# Patient Record
Sex: FEMALE | Race: WHITE | NOT HISPANIC OR LATINO
[De-identification: names, ages, dates, MRNs, and addresses within clinical notes are randomized per-mention and may not be internally consistent; named-entity substitution may affect disease eponyms.]

---

## 2019-03-12 ENCOUNTER — RX RENEWAL (OUTPATIENT)
Age: 49
End: 2019-03-12

## 2020-02-19 ENCOUNTER — EMERGENCY (EMERGENCY)
Facility: HOSPITAL | Age: 50
LOS: 1 days | Discharge: HOME | End: 2020-02-19
Attending: EMERGENCY MEDICINE
Payer: COMMERCIAL

## 2020-02-19 VITALS
OXYGEN SATURATION: 97 % | HEART RATE: 81 BPM | WEIGHT: 164.91 LBS | TEMPERATURE: 98 F | DIASTOLIC BLOOD PRESSURE: 96 MMHG | HEIGHT: 64 IN | SYSTOLIC BLOOD PRESSURE: 156 MMHG | RESPIRATION RATE: 18 BRPM

## 2020-02-19 VITALS
OXYGEN SATURATION: 98 % | RESPIRATION RATE: 18 BRPM | DIASTOLIC BLOOD PRESSURE: 101 MMHG | HEART RATE: 75 BPM | SYSTOLIC BLOOD PRESSURE: 185 MMHG | TEMPERATURE: 98 F

## 2020-02-19 LAB
ALBUMIN SERPL ELPH-MCNC: 4.2 G/DL — SIGNIFICANT CHANGE UP (ref 3.3–5)
ALP SERPL-CCNC: 76 U/L — SIGNIFICANT CHANGE UP (ref 40–120)
ALT FLD-CCNC: 20 U/L — SIGNIFICANT CHANGE UP (ref 10–45)
ANION GAP SERPL CALC-SCNC: 14 MMOL/L — SIGNIFICANT CHANGE UP (ref 5–17)
AST SERPL-CCNC: 18 U/L — SIGNIFICANT CHANGE UP (ref 10–40)
BASOPHILS # BLD AUTO: 0.03 K/UL — SIGNIFICANT CHANGE UP (ref 0–0.2)
BASOPHILS NFR BLD AUTO: 0.3 % — SIGNIFICANT CHANGE UP (ref 0–2)
BILIRUB SERPL-MCNC: 0.3 MG/DL — SIGNIFICANT CHANGE UP (ref 0.2–1.2)
BUN SERPL-MCNC: 9 MG/DL — SIGNIFICANT CHANGE UP (ref 7–23)
CALCIUM SERPL-MCNC: 9 MG/DL — SIGNIFICANT CHANGE UP (ref 8.4–10.5)
CHLORIDE SERPL-SCNC: 105 MMOL/L — SIGNIFICANT CHANGE UP (ref 96–108)
CO2 SERPL-SCNC: 23 MMOL/L — SIGNIFICANT CHANGE UP (ref 22–31)
CREAT SERPL-MCNC: 0.57 MG/DL — SIGNIFICANT CHANGE UP (ref 0.5–1.3)
EOSINOPHIL # BLD AUTO: 0.16 K/UL — SIGNIFICANT CHANGE UP (ref 0–0.5)
EOSINOPHIL NFR BLD AUTO: 1.8 % — SIGNIFICANT CHANGE UP (ref 0–6)
GLUCOSE SERPL-MCNC: 102 MG/DL — HIGH (ref 70–99)
HCT VFR BLD CALC: 41.9 % — SIGNIFICANT CHANGE UP (ref 34.5–45)
HGB BLD-MCNC: 13.7 G/DL — SIGNIFICANT CHANGE UP (ref 11.5–15.5)
IMM GRANULOCYTES NFR BLD AUTO: 0.2 % — SIGNIFICANT CHANGE UP (ref 0–1.5)
LYMPHOCYTES # BLD AUTO: 2.39 K/UL — SIGNIFICANT CHANGE UP (ref 1–3.3)
LYMPHOCYTES # BLD AUTO: 26.8 % — SIGNIFICANT CHANGE UP (ref 13–44)
MCHC RBC-ENTMCNC: 27.1 PG — SIGNIFICANT CHANGE UP (ref 27–34)
MCHC RBC-ENTMCNC: 32.7 GM/DL — SIGNIFICANT CHANGE UP (ref 32–36)
MCV RBC AUTO: 83 FL — SIGNIFICANT CHANGE UP (ref 80–100)
MONOCYTES # BLD AUTO: 0.94 K/UL — HIGH (ref 0–0.9)
MONOCYTES NFR BLD AUTO: 10.5 % — SIGNIFICANT CHANGE UP (ref 2–14)
NEUTROPHILS # BLD AUTO: 5.38 K/UL — SIGNIFICANT CHANGE UP (ref 1.8–7.4)
NEUTROPHILS NFR BLD AUTO: 60.4 % — SIGNIFICANT CHANGE UP (ref 43–77)
NRBC # BLD: 0 /100 WBCS — SIGNIFICANT CHANGE UP (ref 0–0)
PLATELET # BLD AUTO: 225 K/UL — SIGNIFICANT CHANGE UP (ref 150–400)
POTASSIUM SERPL-MCNC: 4 MMOL/L — SIGNIFICANT CHANGE UP (ref 3.5–5.3)
POTASSIUM SERPL-SCNC: 4 MMOL/L — SIGNIFICANT CHANGE UP (ref 3.5–5.3)
PROT SERPL-MCNC: 6.7 G/DL — SIGNIFICANT CHANGE UP (ref 6–8.3)
RBC # BLD: 5.05 M/UL — SIGNIFICANT CHANGE UP (ref 3.8–5.2)
RBC # FLD: 12.9 % — SIGNIFICANT CHANGE UP (ref 10.3–14.5)
SODIUM SERPL-SCNC: 142 MMOL/L — SIGNIFICANT CHANGE UP (ref 135–145)
WBC # BLD: 8.92 K/UL — SIGNIFICANT CHANGE UP (ref 3.8–10.5)
WBC # FLD AUTO: 8.92 K/UL — SIGNIFICANT CHANGE UP (ref 3.8–10.5)

## 2020-02-19 PROCEDURE — 85027 COMPLETE CBC AUTOMATED: CPT

## 2020-02-19 PROCEDURE — 93005 ELECTROCARDIOGRAM TRACING: CPT

## 2020-02-19 PROCEDURE — 36415 COLL VENOUS BLD VENIPUNCTURE: CPT

## 2020-02-19 PROCEDURE — 80053 COMPREHEN METABOLIC PANEL: CPT

## 2020-02-19 PROCEDURE — 99284 EMERGENCY DEPT VISIT MOD MDM: CPT

## 2020-02-19 PROCEDURE — 99283 EMERGENCY DEPT VISIT LOW MDM: CPT

## 2020-02-19 RX ORDER — SODIUM CHLORIDE 9 MG/ML
1000 INJECTION INTRAMUSCULAR; INTRAVENOUS; SUBCUTANEOUS ONCE
Refills: 0 | Status: COMPLETED | OUTPATIENT
Start: 2020-02-19 | End: 2020-02-19

## 2020-02-19 RX ORDER — MECLIZINE HCL 12.5 MG
1 TABLET ORAL
Qty: 9 | Refills: 0
Start: 2020-02-19 | End: 2020-02-21

## 2020-02-19 RX ADMIN — SODIUM CHLORIDE 1000 MILLILITER(S): 9 INJECTION INTRAMUSCULAR; INTRAVENOUS; SUBCUTANEOUS at 20:58

## 2020-02-19 NOTE — ED ADULT NURSE NOTE - NSIMPLEMENTINTERV_GEN_ALL_ED
Implemented All Universal Safety Interventions:  [place] to call system. Call bell, personal items and telephone within reach. Instruct patient to call for assistance. Room bathroom lighting operational. Non-slip footwear when patient is off stretcher. Physically safe environment: no spills, clutter or unnecessary equipment. Stretcher in lowest position, wheels locked, appropriate side rails in place.

## 2020-02-19 NOTE — ED PROVIDER NOTE - NSFOLLOWUPINSTRUCTIONS_ED_ALL_ED_FT
1.  Stay well hydrated  2.  Take meclizine 25-50mgs every 8 hrs as needed for dizziness  3.  Follow up with your PMD in 2-3 days.       Follow up with ENT upon discharge  4. Return to the ER for worsening dizziness, headache, blurry vision, weakness, numbness or any other concerning symptoms

## 2020-02-19 NOTE — ED PROVIDER NOTE - PHYSICAL EXAMINATION
Gen: AAO x 3, NAD  Skin: No rashes or lesions  HEENT: NC/AT, PERRLA, EOMI, MMM  Resp: unlabored CTAB  Cardiac: rrr s1s2, no murmurs, rubs or gallops  GI: ND, +BS, Soft, NT  Ext: no pedal edema, FROM in all extremities  Neuro: no focal deficits, Strength 5/5 BUE and BLE, sensation intact.  Normal finger to nose, normal heel to shin, normal tandem gait, negative romberg, no pronator drift Gen: AAO x 3, NAD  Skin: No rashes or lesions  HEENT: NC/AT, PERRLA, EOMI, MMM  Resp: unlabored CTAB  Cardiac: rrr s1s2, no murmurs, rubs or gallops  GI: ND, +BS, Soft, NT  Ext: no pedal edema, FROM in all extremities  Neuro: no focal deficits, Strength 5/5 BUE and BLE, sensation intact.  Normal finger to nose, normal heel to shin, normal tandem gait, negative romberg, no pronator drift      Attending note. She is alert and in no acute distress. There is no facial symmetry. Pupils are 3 mm equal reactive. Extraocular muscles are intact. There are a few beats of nystagmus to the left. ENT examination is normal. Hearing is grossly intact. Oropharynx is normal. Neck is supple and nontender. Lungs are clear and equal bilaterally. Heart is regular in rhythm without murmur. Abdomen is soft and nontender. Neurologic examination is intact and nonfocal. Romberg test is negative. Finger to nose is intact. Sensation is intact and normal. Vertigo is exacerbated by movements and extinguishes and extinguishes wine staying still.

## 2020-02-19 NOTE — ED ADULT NURSE NOTE - OBJECTIVE STATEMENT
49yF who presents to the ED from home for complaints of dizziness. PMH of hypothyroidism on synthroid. Pt states she has a sudden onset of dizziness and N at 1330 today. Since she has been having intermittent episodes which is unrelieved by lying down 49yF who presents to the ED from home for complaints of dizziness. PMH of hypothyroidism on synthroid. Pt states she has a sudden onset of dizziness and N at 1330 today while being a passenger in her car. Since she has been having intermittent episodes which is unrelieved by lying down. Pt took meclizine at 1400 today and symptoms still persisted. pt states these episodes have never happened before. Pt denies vomiting, fevers/chills, CP, SOB, abdominal pain, tinnitus, blurred vision, numbness/tingling, headache, C/D. Pt AAOx4, VS as documented. IV placed. Labs drawn and sent.

## 2020-02-19 NOTE — ED PROVIDER NOTE - PATIENT PORTAL LINK FT
You can access the FollowMyHealth Patient Portal offered by [facility] by registering at the following website: [email or web address]. By joining [facility]’s FollowMyHealth portal, you will also be able to view your health information using other applications (apps) compatible with our system.

## 2020-02-19 NOTE — ED PROVIDER NOTE - OBJECTIVE STATEMENT
50 yo female with PMHx of Hypothyroid p/w dizziness.  Patient reports that she was the passenger driving home this afternoon and felt nauseous.  Upon arriving how she first felt lightheaded, then experiencing room spinning dizziness.  Dizziness was worse with moving her head and standing up.  improved with laying still.  Took meclizine 25mgs at 2 pm, initially without improvement, but upon arrival to the ER patient is feeling much better.  Dizziness improved, now only with slight lightheadedness.  Denies CP, SOB, abd pain, NVD, dysuria, weakness, numbness, blurry vision. 50 yo female with PMHx of Hypothyroid p/w dizziness.  Patient reports that she was the passenger driving home this afternoon and felt nauseous.  Upon arriving how she first felt lightheaded, then experiencing room spinning dizziness.  Dizziness was worse with moving her head and standing up.  improved with laying still.  Took meclizine 25mgs at 2 pm, initially without improvement, but upon arrival to the ER patient is feeling much better.  Dizziness improved, now only with slight lightheadedness.  Denies CP, SOB, abd pain, NVD, dysuria, weakness, numbness, blurry vision.      Attending note.  Patient was seen in the fast track room #5. Agree with the above. Patient had acute onset of vertigo after driving. She felt some lightheadedness and dizziness. She denies any chest pain or palpitations. She denies any hearing loss or tinnitus. She denies any prior episodes. She has no other neurologic symptoms. Vertigo is exacerbated by movements and extinguishes one stainless steel. She denies any trauma or recent ENT complaints. She has no previous episode of vertigo. She denies any fevers or chills. She took 25 mg of meclizine and 2 PM currently symptoms have subsided.

## 2020-04-20 ENCOUNTER — APPOINTMENT (OUTPATIENT)
Dept: OTOLARYNGOLOGY | Facility: CLINIC | Age: 50
End: 2020-04-20

## 2021-06-28 NOTE — ED ADULT NURSE NOTE - TEMPLATE LIST FOR HEAD TO TOE ASSESSMENT
Arlinda Frankel  [id number]  1990    EGD DISCHARGE INSTRUCTIONS  Discomfort:  Sore throat- throat lozenges or warm salt water gargle  redness at IV site- apply warm compress to area; if redness or soreness persist- contact your physician  Gaseous discomfort- walking, belching will help relieve any discomfort  You may not operate a vehicle until the next day  You may not engage in an occupation involving machinery or appliances until the next day  You may not drink alcoholic beverages until the next day  Avoid making any critical decisions for at least 24 hour    DIET   You may not resume your regular diet. Antireflux diet. ACTIVITY  You may not resume your normal daily activities   Spend the remainder of the day resting -  avoid any strenuous activity. CALL M.D. ANY SIGN OF   Increasing pain, nausea, vomiting  Abdominal distension (swelling)  New increased bleeding (oral or rectal)  Fever (chills)  Pain in chest area  Bloody discharge from nose or mouth  Shortness of breath     You may not take any Advil, Aspirin, Ibuprofen, Motrin, Aleve, or Goodys preferably ONLY  Tylenol as needed for pain. Follow-up Instructions: Follow-up in the office as scheduled or make a follow-up appointment in 2 weeks.      Evan France MD  June 28, 2021 General

## 2022-10-17 ENCOUNTER — APPOINTMENT (OUTPATIENT)
Dept: ENDOCRINOLOGY | Facility: CLINIC | Age: 52
End: 2022-10-17
Payer: COMMERCIAL

## 2022-10-17 VITALS
OXYGEN SATURATION: 99 % | DIASTOLIC BLOOD PRESSURE: 78 MMHG | SYSTOLIC BLOOD PRESSURE: 120 MMHG | TEMPERATURE: 98.6 F | BODY MASS INDEX: 28.15 KG/M2 | RESPIRATION RATE: 15 BRPM | WEIGHT: 164 LBS | HEART RATE: 78 BPM

## 2022-10-17 PROCEDURE — 99204 OFFICE O/P NEW MOD 45 MIN: CPT

## 2022-10-23 NOTE — HISTORY OF PRESENT ILLNESS
[FreeTextEntry1] : Ms. NELSON is a 52 year old female who presents for initial endocrine evaluation. She had previously seen me at my prior Office more than three year ago. She presents with regard to a history of hypothyroidism diagnosed in 1997. Patient had been taking levothyroxine 125 mcg daily for the last 3-4 years . She had seen her cardiologist due to elevated BP/ migraines and had complete blood work done. Labs from 10/7/22 show TSH at  0.013 with FT4 1.78.\par \par She denies any palpitations, sob , tremors, anxiety, weight changes, temperature intolerance, severe fatigue, change in bowel habits, changes in mood. she also denies skin, hair, or nail changes. \par \par In early October, she did experience a migraine for 4 days with BP at home systolic 220. When she saw her cardiologist, BP had been 180/105. She was started on  amlodipine 10 mg since then and does have cardiology follow up appt. on 10/25/22. \par BP in office today  120/74 \par \par labs dated 10/7/22\par cbc stable \par ALT 35, chol 216, hdl 47 ldl 136 trig 202 \par a1c 5.0% \par \par  Additional medical history includes that of HTN, gestational diabetes only in first pregnancy, second pregnancy normal.\par Medications: vitamin C 2,000 mg, vitamin D 4000 IU, amlodipine 10 mg,  \par \par LMP in June 2022 and then prior to that Aug 2021. \par \par Family Hx: Father with HTN/HLD. Mother with HTN/HLD. Brother with HTN and CVD. 1st cousins had thyroid disease, unknown if other family members had.\par \par COVID infection May 2022, did receive paxlovid and no residual symptoms. COVID vaccinated without booster \par \par Social HX: Socially drinks. Denies drug use. past cigarette smoker, quit 15 years ago. \par \par Cardiologist: Dr. Carlos Michelle \par PMD: Looking for new PMD\par \par Past hospitalizations: hospitalization for vertigo in February 2020. Etiology uncertain. Has no reoccurrence since.

## 2023-01-09 ENCOUNTER — APPOINTMENT (OUTPATIENT)
Dept: ENDOCRINOLOGY | Facility: CLINIC | Age: 53
End: 2023-01-09
Payer: COMMERCIAL

## 2023-01-09 VITALS
SYSTOLIC BLOOD PRESSURE: 110 MMHG | DIASTOLIC BLOOD PRESSURE: 70 MMHG | OXYGEN SATURATION: 98 % | HEIGHT: 64 IN | WEIGHT: 164 LBS | TEMPERATURE: 97.5 F | HEART RATE: 85 BPM | BODY MASS INDEX: 28 KG/M2

## 2023-01-09 LAB
25(OH)D3 SERPL-MCNC: 39.4 NG/ML
ESTRADIOL SERPL-MCNC: 9 PG/ML
FOLATE SERPL-MCNC: >20 NG/ML
FSH SERPL-MCNC: 14.3 IU/L
LH SERPL-ACNC: 6 IU/L
T3FREE SERPL-MCNC: 2.75 PG/ML
T4 FREE SERPL-MCNC: 1.7 NG/DL
TSH SERPL-ACNC: 0.12 UIU/ML
VIT B12 SERPL-MCNC: 368 PG/ML

## 2023-01-09 PROCEDURE — 76536 US EXAM OF HEAD AND NECK: CPT

## 2023-01-09 PROCEDURE — 99214 OFFICE O/P EST MOD 30 MIN: CPT

## 2023-01-09 RX ORDER — MAGNESIUM 200 MG
1000 TABLET ORAL
Qty: 45 | Refills: 0 | Status: ACTIVE | COMMUNITY
Start: 2023-01-09 | End: 1900-01-01

## 2023-01-09 NOTE — PROCEDURE
[[employer] e 2008 model, 10-12 MHz frequencies] : multiple real time longitudinal and transverse images were obtained using a high resolution ultrasound with a linear transducer, [employer] e 2008 model, 10-12 MHz frequencies. All measurements will be reported as longitudinal x matheus-posterior x transverse. [Hypothyroidism] : hypothyroidism [] : a heterogeneous parenchyma [FreeTextEntry1] : 2.90 x 1.91 x 1.84 [FreeTextEntry5] : 3.16 x 0.88 x 1.70 [FreeTextEntry2] : 0.23

## 2023-01-09 NOTE — PROCEDURE
[[employer] e 2008 model, 10-12 MHz frequencies] : multiple real time longitudinal and transverse images were obtained using a high resolution ultrasound with a linear transducer, [employer] e 2008 model, 10-12 MHz frequencies. All measurements will be reported as longitudinal x matheus-posterior x transverse. [Hypothyroidism] : hypothyroidism [] : a heterogeneous parenchyma [FreeTextEntry1] : 2.90 x 1.91 x 1.84 [FreeTextEntry5] : 3.16 x 0.88 x 1.70 [FreeTextEntry2] : 0.23

## 2023-01-10 LAB
THYROGLOB AB SERPL-ACNC: <20 IU/ML
THYROPEROXIDASE AB SERPL IA-ACNC: <10 IU/ML

## 2023-01-15 NOTE — HISTORY OF PRESENT ILLNESS
[FreeTextEntry1] : Ms. NELSON is a 52 year old female who  returns with regard to a history of hypothyroidism diagnosed in 1997. Patient had been taking levothyroxine 125 mcg daily for the last 3-4 years . She had seen her cardiologist due to elevated BP/ migraines and had complete blood work done. Labs from  1/7/2023 show TSH at  0.27 with FT4 1.7.\par LT4 was lowered to 100 mcg and she returns now for follow up\par \par She denies any palpitations, sob , tremors, anxiety, weight changes, temperature intolerance, severe fatigue, change in bowel habits, changes in mood. she also denies skin, hair, or nail changes. \par \par In early October, she did experience a migraine for 4 days with BP at home systolic 220. When she saw her cardiologist, BP had been 180/105. She was started on  amlodipine 10 mg since then and does have cardiology follow up appt. on 10/25/22. In December 2022 she was now switched off and changed to Diovan 160 mg. Cardiologist Dr. Haider/ [place]\par \par \par  Additional medical history includes that of HTN, gestational diabetes only in first pregnancy, second pregnancy normal.\par Medications: vitamin C 2,000 mg, vitamin D 4000 IU, switched off amlodipine 10 mg,  now on 160mg of Diovan\par \par LMP in June 2022 and then prior to that Aug 2021. \par \par COVID infection May 2022, did receive paxlovid and no residual symptoms. COVID vaccinated without booster \par \par Cardiologist: Dr. Carlos Michelle \par PMD: Looking for new PMD\par \par Past hospitalizations: hospitalization for vertigo in February 2020. Etiology uncertain. Has no reoccurrence since.

## 2023-02-28 LAB
25(OH)D3 SERPL-MCNC: 39.4 NG/ML
T3FREE SERPL-MCNC: 2.64 PG/ML
T4 FREE SERPL-MCNC: 1.6 NG/DL
TSH SERPL-ACNC: 0.48 UIU/ML
VIT B12 SERPL-MCNC: 588 PG/ML

## 2023-05-26 ENCOUNTER — APPOINTMENT (OUTPATIENT)
Dept: ENDOCRINOLOGY | Facility: CLINIC | Age: 53
End: 2023-05-26
Payer: COMMERCIAL

## 2023-05-26 ENCOUNTER — TRANSCRIPTION ENCOUNTER (OUTPATIENT)
Age: 53
End: 2023-05-26

## 2023-05-26 VITALS
HEIGHT: 64 IN | TEMPERATURE: 98.2 F | DIASTOLIC BLOOD PRESSURE: 78 MMHG | HEART RATE: 86 BPM | SYSTOLIC BLOOD PRESSURE: 110 MMHG | WEIGHT: 157 LBS | OXYGEN SATURATION: 98 % | BODY MASS INDEX: 26.8 KG/M2

## 2023-05-26 PROCEDURE — 36415 COLL VENOUS BLD VENIPUNCTURE: CPT

## 2023-05-26 PROCEDURE — 99214 OFFICE O/P EST MOD 30 MIN: CPT | Mod: 25

## 2023-05-30 ENCOUNTER — NON-APPOINTMENT (OUTPATIENT)
Age: 53
End: 2023-05-30

## 2023-05-30 LAB
25(OH)D3 SERPL-MCNC: 34.5 NG/ML
T3FREE SERPL-MCNC: 3.22 PG/ML
T4 FREE SERPL-MCNC: 1.7 NG/DL
THYROGLOB AB SERPL-ACNC: <20 IU/ML
THYROPEROXIDASE AB SERPL IA-ACNC: <10 IU/ML
TSH SERPL-ACNC: 0.34 UIU/ML
VIT B12 SERPL-MCNC: 540 PG/ML

## 2023-05-30 RX ORDER — LEVOTHYROXINE SODIUM 0.09 MG/1
88 TABLET ORAL
Qty: 90 | Refills: 2 | Status: ACTIVE | COMMUNITY
Start: 2019-03-12 | End: 1900-01-01

## 2023-06-04 NOTE — HISTORY OF PRESENT ILLNESS
[FreeTextEntry1] : Ms. NELSON is a 52 year old female who returns with regard to a history of hypothyroidism diagnosed in 1997. Patient had been taking levothyroxine 125 mcg daily for the last 3-4 years . She had seen her cardiologist due to elevated BP/ migraines and had complete blood work done. Labs from 1/7/2023 show TSH at 0.27 with FT4 1.7.\par LT4 was lowered to 88 mcg in January 2023 and she now returns for follow up. \par \par Repeat labs 2/27/23 \par TSH   0.48      free t4: 1.6 \par \par She denies any palpitations, sob , tremors, anxiety, weight changes, temperature intolerance, severe fatigue, change in bowel habits, changes in mood. she also denies skin, hair, or nail changes. \par \par In early October, she did experience a migraine for 4 days with BP at home systolic 220. When she saw her cardiologist, BP had been 180/105. She was started on amlodipine 10 mg since then and does have cardiology follow up appt. on 10/25/22. In December 2022 she was switched off and changed to Diovan 160 mg. She now continues on Valsartan-HCTZ 160-25 mg. Cardiologist Dr. Haider/ [place]\par \par  Additional medical history includes that of HTN, gestational diabetes only in first pregnancy, second pregnancy normal.\par Medications: vitamin C 2,000 mg, vitamin D 2000 IU, vitamin b12,  switched off amlodipine 10 mg, now on Valsartan-HCTZ 160-25 m\par \par LMP in January 2023 and then prior to that  June 2022 \par COVID infection May 2022, did receive paxlovid and no residual symptoms. COVID vaccinated without booster \par \par Cardiologist: Dr. Carlos Michelle \par PMD: Looking for new PMD\par \par Past hospitalizations: hospitalization for vertigo in February 2020. Etiology uncertain. Has no reoccurrence since. \par

## 2023-11-06 ENCOUNTER — APPOINTMENT (OUTPATIENT)
Dept: ENDOCRINOLOGY | Facility: CLINIC | Age: 53
End: 2023-11-06

## 2023-11-29 NOTE — ED ADULT NURSE NOTE - DOES PATIENT HAVE ADVANCE DIRECTIVE
unknown Render In Strict Bullet Format?: No Detail Level: Zone Continue Regimen: Accutane 40mg bid with food

## 2024-03-05 ENCOUNTER — APPOINTMENT (OUTPATIENT)
Dept: ENDOCRINOLOGY | Facility: CLINIC | Age: 54
End: 2024-03-05
Payer: COMMERCIAL

## 2024-03-05 VITALS
WEIGHT: 165.56 LBS | HEIGHT: 64 IN | SYSTOLIC BLOOD PRESSURE: 110 MMHG | TEMPERATURE: 98.5 F | OXYGEN SATURATION: 95 % | BODY MASS INDEX: 28.27 KG/M2 | DIASTOLIC BLOOD PRESSURE: 60 MMHG | HEART RATE: 74 BPM

## 2024-03-05 DIAGNOSIS — E03.9 HYPOTHYROIDISM, UNSPECIFIED: ICD-10-CM

## 2024-03-05 DIAGNOSIS — O24.419 GESTATIONAL DIABETES MELLITUS IN PREGNANCY, UNSPECIFIED CONTROL: ICD-10-CM

## 2024-03-05 DIAGNOSIS — E55.9 VITAMIN D DEFICIENCY, UNSPECIFIED: ICD-10-CM

## 2024-03-05 DIAGNOSIS — R79.89 OTHER SPECIFIED ABNORMAL FINDINGS OF BLOOD CHEMISTRY: ICD-10-CM

## 2024-03-05 DIAGNOSIS — I10 ESSENTIAL (PRIMARY) HYPERTENSION: ICD-10-CM

## 2024-03-05 PROCEDURE — 99214 OFFICE O/P EST MOD 30 MIN: CPT

## 2024-03-06 LAB
25(OH)D3 SERPL-MCNC: 38.6 NG/ML
ALBUMIN SERPL ELPH-MCNC: 4.6 G/DL
ALP BLD-CCNC: 76 U/L
ALT SERPL-CCNC: 26 U/L
ANION GAP SERPL CALC-SCNC: 17 MMOL/L
AST SERPL-CCNC: 29 U/L
BILIRUB SERPL-MCNC: 0.4 MG/DL
BUN SERPL-MCNC: 18 MG/DL
CALCIUM SERPL-MCNC: 9.5 MG/DL
CHLORIDE SERPL-SCNC: 103 MMOL/L
CHOLEST SERPL-MCNC: 198 MG/DL
CO2 SERPL-SCNC: 25 MMOL/L
CREAT SERPL-MCNC: 0.88 MG/DL
EGFR: 79 ML/MIN/1.73M2
ESTIMATED AVERAGE GLUCOSE: 103 MG/DL
GLUCOSE SERPL-MCNC: 86 MG/DL
HBA1C MFR BLD HPLC: 5.2 %
HDLC SERPL-MCNC: 45 MG/DL
LDLC SERPL DIRECT ASSAY-MCNC: 116 MG/DL
MAGNESIUM SERPL-MCNC: 2.1 MG/DL
POTASSIUM SERPL-SCNC: 3.7 MMOL/L
PROT SERPL-MCNC: 6.9 G/DL
SODIUM SERPL-SCNC: 145 MMOL/L
T3FREE SERPL-MCNC: 2.97 PG/ML
T4 FREE SERPL-MCNC: 1.5 NG/DL
TRIGL SERPL-MCNC: 194 MG/DL
TSH SERPL-ACNC: 1.08 UIU/ML
VIT B12 SERPL-MCNC: 544 PG/ML

## 2024-03-07 LAB
THYROGLOB AB SERPL-ACNC: <20 IU/ML
THYROPEROXIDASE AB SERPL IA-ACNC: <10 IU/ML

## 2024-03-12 PROBLEM — I10 BENIGN ESSENTIAL HYPERTENSION: Status: ACTIVE | Noted: 2022-10-17

## 2024-03-12 PROBLEM — R79.89 LOW VITAMIN B12 LEVEL: Status: ACTIVE | Noted: 2023-01-09

## 2024-03-12 NOTE — HISTORY OF PRESENT ILLNESS
[FreeTextEntry1] : Ms. NELSON is a 53 year old female who returns with regard to a history of hypothyroidism diagnosed in 1997. Patient had been taking levothyroxine 88 mcg daily and she returns now for follow up.   She denies any palpitations, sob , tremors, anxiety, weight changes, temperature intolerance, severe fatigue, change in bowel habits, changes in mood. she also denies skin, hair, or nail changes.   is taking valsartan 160 mg for hypertension. Cardiologist Dr. Haider/ [place]   Additional medical history includes that of HTN, gestational diabetes only in first pregnancy, second pregnancy normal. Medications: vitamin D 4000 IU, switched off amlodipine 10 mg, now on 160 mg of Diovan Too, is taking vitamin D and b12 intermittently - often forgets to take.   LMP in June 2022 and then prior to that Aug 2021.   COVID infection May 2022, did receive paxlovid and no residual symptoms. COVID vaccinated without booster   Cardiologist: Dr. Carlos Michelle  PMD: Looking for new PMD  Past hospitalizations: hospitalization for vertigo in February 2020. Etiology uncertain. Has no reoccurrence since.

## 2024-06-12 ENCOUNTER — RX RENEWAL (OUTPATIENT)
Age: 54
End: 2024-06-12

## 2024-06-12 RX ORDER — LEVOTHYROXINE SODIUM 0.09 MG/1
88 TABLET ORAL DAILY
Qty: 90 | Refills: 2 | Status: ACTIVE | COMMUNITY
Start: 2023-01-09 | End: 1900-01-01

## 2024-12-16 ENCOUNTER — APPOINTMENT (OUTPATIENT)
Dept: ENDOCRINOLOGY | Facility: CLINIC | Age: 54
End: 2024-12-16

## 2025-04-19 ENCOUNTER — RX RENEWAL (OUTPATIENT)
Age: 55
End: 2025-04-19

## 2025-04-21 ENCOUNTER — NON-APPOINTMENT (OUTPATIENT)
Age: 55
End: 2025-04-21

## 2025-04-23 ENCOUNTER — APPOINTMENT (OUTPATIENT)
Dept: ENDOCRINOLOGY | Facility: CLINIC | Age: 55
End: 2025-04-23
Payer: COMMERCIAL

## 2025-04-23 VITALS
BODY MASS INDEX: 27.68 KG/M2 | OXYGEN SATURATION: 96 % | HEART RATE: 76 BPM | HEIGHT: 64 IN | SYSTOLIC BLOOD PRESSURE: 122 MMHG | DIASTOLIC BLOOD PRESSURE: 82 MMHG | WEIGHT: 162.13 LBS

## 2025-04-23 DIAGNOSIS — E66.3 OVERWEIGHT: ICD-10-CM

## 2025-04-23 DIAGNOSIS — I10 ESSENTIAL (PRIMARY) HYPERTENSION: ICD-10-CM

## 2025-04-23 DIAGNOSIS — E78.5 HYPERLIPIDEMIA, UNSPECIFIED: ICD-10-CM

## 2025-04-23 DIAGNOSIS — E03.9 HYPOTHYROIDISM, UNSPECIFIED: ICD-10-CM

## 2025-04-23 DIAGNOSIS — R79.89 OTHER SPECIFIED ABNORMAL FINDINGS OF BLOOD CHEMISTRY: ICD-10-CM

## 2025-04-23 DIAGNOSIS — E55.9 VITAMIN D DEFICIENCY, UNSPECIFIED: ICD-10-CM

## 2025-04-23 PROCEDURE — 36415 COLL VENOUS BLD VENIPUNCTURE: CPT

## 2025-04-23 PROCEDURE — 99214 OFFICE O/P EST MOD 30 MIN: CPT

## 2025-04-23 RX ORDER — TIRZEPATIDE 2.5 MG/.5ML
2.5 INJECTION, SOLUTION SUBCUTANEOUS
Qty: 3 | Refills: 1 | Status: ACTIVE | COMMUNITY
Start: 2025-04-23 | End: 1900-01-01

## 2025-04-24 DIAGNOSIS — E87.0 HYPEROSMOLALITY AND HYPERNATREMIA: ICD-10-CM

## 2025-04-24 LAB
25(OH)D3 SERPL-MCNC: 30.1 NG/ML
ALBUMIN SERPL ELPH-MCNC: 4.8 G/DL
ALP BLD-CCNC: 92 U/L
ALT SERPL-CCNC: 23 U/L
ANION GAP SERPL CALC-SCNC: 18 MMOL/L
AST SERPL-CCNC: 22 U/L
BILIRUB SERPL-MCNC: 0.4 MG/DL
BUN SERPL-MCNC: 17 MG/DL
CALCIUM SERPL-MCNC: 9.6 MG/DL
CHLORIDE SERPL-SCNC: 104 MMOL/L
CO2 SERPL-SCNC: 25 MMOL/L
CREAT SERPL-MCNC: 0.99 MG/DL
EGFRCR SERPLBLD CKD-EPI 2021: 68 ML/MIN/1.73M2
ESTRADIOL SERPL-MCNC: <5 PG/ML
FOLATE SERPL-MCNC: 19.4 NG/ML
FSH SERPL-MCNC: 55.2 IU/L
GLUCOSE SERPL-MCNC: 108 MG/DL
LH SERPL-ACNC: 29.7 IU/L
POTASSIUM SERPL-SCNC: 4.2 MMOL/L
PROT SERPL-MCNC: 6.9 G/DL
SODIUM SERPL-SCNC: 147 MMOL/L
T3FREE SERPL-MCNC: 3.02 PG/ML
T4 FREE SERPL-MCNC: 1.6 NG/DL
TSH SERPL-ACNC: 0.36 UIU/ML
VIT B12 SERPL-MCNC: 463 PG/ML

## 2025-06-23 RX ORDER — TIRZEPATIDE 5 MG/.5ML
5 INJECTION, SOLUTION SUBCUTANEOUS
Qty: 3 | Refills: 3 | Status: ACTIVE | COMMUNITY
Start: 2025-06-23 | End: 1900-01-01

## 2025-09-03 ENCOUNTER — APPOINTMENT (OUTPATIENT)
Dept: ENDOCRINOLOGY | Facility: CLINIC | Age: 55
End: 2025-09-03